# Patient Record
Sex: MALE | Race: BLACK OR AFRICAN AMERICAN | NOT HISPANIC OR LATINO | URBAN - METROPOLITAN AREA
[De-identification: names, ages, dates, MRNs, and addresses within clinical notes are randomized per-mention and may not be internally consistent; named-entity substitution may affect disease eponyms.]

---

## 2017-03-09 ENCOUNTER — EMERGENCY (EMERGENCY)
Facility: HOSPITAL | Age: 52
LOS: 1 days | Discharge: HOME | End: 2017-03-09
Attending: EMERGENCY MEDICINE | Admitting: EMERGENCY MEDICINE
Payer: COMMERCIAL

## 2017-03-09 VITALS
RESPIRATION RATE: 18 BRPM | DIASTOLIC BLOOD PRESSURE: 81 MMHG | OXYGEN SATURATION: 97 % | HEART RATE: 72 BPM | TEMPERATURE: 98 F | SYSTOLIC BLOOD PRESSURE: 155 MMHG

## 2017-03-09 VITALS
WEIGHT: 235.01 LBS | TEMPERATURE: 98 F | OXYGEN SATURATION: 97 % | HEART RATE: 79 BPM | RESPIRATION RATE: 18 BRPM | SYSTOLIC BLOOD PRESSURE: 148 MMHG | DIASTOLIC BLOOD PRESSURE: 88 MMHG

## 2017-03-09 DIAGNOSIS — F17.200 NICOTINE DEPENDENCE, UNSPECIFIED, UNCOMPLICATED: ICD-10-CM

## 2017-03-09 DIAGNOSIS — R07.89 OTHER CHEST PAIN: ICD-10-CM

## 2017-03-09 DIAGNOSIS — R20.0 ANESTHESIA OF SKIN: ICD-10-CM

## 2017-03-09 DIAGNOSIS — Z79.891 LONG TERM (CURRENT) USE OF OPIATE ANALGESIC: ICD-10-CM

## 2017-03-09 DIAGNOSIS — R42 DIZZINESS AND GIDDINESS: ICD-10-CM

## 2017-03-09 LAB
ALBUMIN SERPL ELPH-MCNC: 3.8 G/DL — SIGNIFICANT CHANGE UP (ref 3.4–5)
ALP SERPL-CCNC: 79 U/L — SIGNIFICANT CHANGE UP (ref 40–120)
ALT FLD-CCNC: 39 U/L — SIGNIFICANT CHANGE UP (ref 12–42)
ANION GAP SERPL CALC-SCNC: 8 MMOL/L — LOW (ref 9–16)
APTT BLD: 34.3 SEC — SIGNIFICANT CHANGE UP (ref 27.5–37.4)
AST SERPL-CCNC: 23 U/L — SIGNIFICANT CHANGE UP (ref 15–37)
BASOPHILS NFR BLD AUTO: 0.3 % — SIGNIFICANT CHANGE UP (ref 0–2)
BILIRUB SERPL-MCNC: 0.6 MG/DL — SIGNIFICANT CHANGE UP (ref 0.2–1.2)
BUN SERPL-MCNC: 18 MG/DL — SIGNIFICANT CHANGE UP (ref 7–23)
CALCIUM SERPL-MCNC: 8.4 MG/DL — LOW (ref 8.5–10.5)
CHLORIDE SERPL-SCNC: 103 MMOL/L — SIGNIFICANT CHANGE UP (ref 96–108)
CK MB CFR SERPL CALC: 2.7 NG/ML — SIGNIFICANT CHANGE UP (ref 0.5–3.6)
CK SERPL-CCNC: 562 U/L — HIGH (ref 39–308)
CO2 SERPL-SCNC: 25 MMOL/L — SIGNIFICANT CHANGE UP (ref 22–31)
CREAT SERPL-MCNC: 0.98 MG/DL — SIGNIFICANT CHANGE UP (ref 0.5–1.3)
EOSINOPHIL NFR BLD AUTO: 2 % — SIGNIFICANT CHANGE UP (ref 0–6)
GLUCOSE SERPL-MCNC: 125 MG/DL — HIGH (ref 70–99)
HCT VFR BLD CALC: 40.6 % — SIGNIFICANT CHANGE UP (ref 39–50)
HGB BLD-MCNC: 13.9 G/DL — SIGNIFICANT CHANGE UP (ref 13–17)
INR BLD: 1.04 — SIGNIFICANT CHANGE UP (ref 0.88–1.16)
LYMPHOCYTES # BLD AUTO: 29.4 % — SIGNIFICANT CHANGE UP (ref 13–44)
MCHC RBC-ENTMCNC: 23.2 PG — LOW (ref 27–34)
MCHC RBC-ENTMCNC: 34.2 G/DL — SIGNIFICANT CHANGE UP (ref 32–36)
MCV RBC AUTO: 67.7 FL — LOW (ref 80–100)
MONOCYTES NFR BLD AUTO: 6.1 % — SIGNIFICANT CHANGE UP (ref 2–14)
NEUTROPHILS NFR BLD AUTO: 62.2 % — SIGNIFICANT CHANGE UP (ref 43–77)
PLATELET # BLD AUTO: 207 K/UL — SIGNIFICANT CHANGE UP (ref 150–400)
POTASSIUM SERPL-MCNC: 4 MMOL/L — SIGNIFICANT CHANGE UP (ref 3.5–5.3)
POTASSIUM SERPL-SCNC: 4 MMOL/L — SIGNIFICANT CHANGE UP (ref 3.5–5.3)
PROT SERPL-MCNC: 7.5 G/DL — SIGNIFICANT CHANGE UP (ref 6.4–8.2)
PROTHROM AB SERPL-ACNC: 11.5 SEC — SIGNIFICANT CHANGE UP (ref 10–13.1)
RBC # BLD: 6 M/UL — HIGH (ref 4.2–5.8)
RBC # FLD: 17.1 % — HIGH (ref 10.3–16.9)
SODIUM SERPL-SCNC: 136 MMOL/L — SIGNIFICANT CHANGE UP (ref 135–145)
TROPONIN I SERPL-MCNC: <0.015 NG/ML — SIGNIFICANT CHANGE UP (ref 0.01–0.04)
WBC # BLD: 9.7 K/UL — SIGNIFICANT CHANGE UP (ref 3.8–10.5)
WBC # FLD AUTO: 9.7 K/UL — SIGNIFICANT CHANGE UP (ref 3.8–10.5)

## 2017-03-09 PROCEDURE — 85610 PROTHROMBIN TIME: CPT

## 2017-03-09 PROCEDURE — 93010 ELECTROCARDIOGRAM REPORT: CPT

## 2017-03-09 PROCEDURE — 93010 ELECTROCARDIOGRAM REPORT: CPT | Mod: 77

## 2017-03-09 PROCEDURE — 71275 CT ANGIOGRAPHY CHEST: CPT

## 2017-03-09 PROCEDURE — 93005 ELECTROCARDIOGRAM TRACING: CPT | Mod: 77

## 2017-03-09 PROCEDURE — 82553 CREATINE MB FRACTION: CPT

## 2017-03-09 PROCEDURE — 82550 ASSAY OF CK (CPK): CPT

## 2017-03-09 PROCEDURE — 99285 EMERGENCY DEPT VISIT HI MDM: CPT | Mod: 25

## 2017-03-09 PROCEDURE — 85730 THROMBOPLASTIN TIME PARTIAL: CPT

## 2017-03-09 PROCEDURE — 71275 CT ANGIOGRAPHY CHEST: CPT | Mod: 26

## 2017-03-09 PROCEDURE — 84484 ASSAY OF TROPONIN QUANT: CPT

## 2017-03-09 PROCEDURE — 70498 CT ANGIOGRAPHY NECK: CPT

## 2017-03-09 PROCEDURE — 96374 THER/PROPH/DIAG INJ IV PUSH: CPT

## 2017-03-09 PROCEDURE — 85025 COMPLETE CBC W/AUTO DIFF WBC: CPT

## 2017-03-09 PROCEDURE — 99284 EMERGENCY DEPT VISIT MOD MDM: CPT | Mod: 25

## 2017-03-09 PROCEDURE — 70498 CT ANGIOGRAPHY NECK: CPT | Mod: 26

## 2017-03-09 PROCEDURE — 80053 COMPREHEN METABOLIC PANEL: CPT

## 2017-03-09 RX ORDER — MECLIZINE HCL 12.5 MG
1 TABLET ORAL
Qty: 9 | Refills: 0 | OUTPATIENT
Start: 2017-03-09 | End: 2017-03-12

## 2017-03-09 RX ORDER — MECLIZINE HCL 12.5 MG
25 TABLET ORAL ONCE
Qty: 0 | Refills: 0 | Status: COMPLETED | OUTPATIENT
Start: 2017-03-09 | End: 2017-03-09

## 2017-03-09 RX ORDER — KETOROLAC TROMETHAMINE 30 MG/ML
30 SYRINGE (ML) INJECTION ONCE
Qty: 0 | Refills: 0 | Status: DISCONTINUED | OUTPATIENT
Start: 2017-03-09 | End: 2017-03-09

## 2017-03-09 RX ADMIN — Medication 30 MILLIGRAM(S): at 20:46

## 2017-03-09 RX ADMIN — Medication 30 MILLIGRAM(S): at 20:41

## 2017-03-09 RX ADMIN — Medication 25 MILLIGRAM(S): at 20:41

## 2017-03-09 NOTE — ED PROVIDER NOTE - MEDICAL DECISION MAKING DETAILS
51y male with right sided chest pain radiating to right arm/right side of neck. Atraumatic. Labs WNL, EKG inc RBBB. Seems MSK in origin given exam findings, but will check CT chest/neck for PE or vascular pathology.

## 2017-03-09 NOTE — ED ADULT NURSE NOTE - CHPI ED SYMPTOMS NEG
no diaphoresis/no cough/no back pain/no vomiting/no syncope/no fever/no chills/no nausea/no shortness of breath

## 2017-03-09 NOTE — ED PROVIDER NOTE - OBJECTIVE STATEMENT
51y male no PMH c/o intermittent sharp right sided chest pain radiating to the right arm with right arm numbness since last weeks. Worse with movement. No injuries. No fever, chills, cough, shortness of breath, travel. +Smoker. No PE/DVT history. No cardiac history.

## 2017-03-09 NOTE — ED ADULT NURSE NOTE - OBJECTIVE STATEMENT
50 y/o male c/o right sided chest pain radiating to the right arm/elbow since yesterday. also c/o feeling dizzy/ losing balance yesterday. pt took motrin at 830 this morning for headache and arm/chest pain. denies any LOC, falls, SOB, n/v/d, cardiac hx, medical problems, daily medications. no acute distress, VS stable, a&ox3, speaking in full sentences, EKG done and labs sent.

## 2021-01-04 ENCOUNTER — EMERGENCY (EMERGENCY)
Facility: HOSPITAL | Age: 56
LOS: 1 days | Discharge: HOME | End: 2021-01-04
Attending: EMERGENCY MEDICINE | Admitting: EMERGENCY MEDICINE
Payer: SELF-PAY

## 2021-01-04 VITALS
RESPIRATION RATE: 17 BRPM | WEIGHT: 235.89 LBS | DIASTOLIC BLOOD PRESSURE: 89 MMHG | OXYGEN SATURATION: 98 % | HEIGHT: 69 IN | TEMPERATURE: 98 F | HEART RATE: 92 BPM | SYSTOLIC BLOOD PRESSURE: 138 MMHG

## 2021-01-04 DIAGNOSIS — L30.9 DERMATITIS, UNSPECIFIED: ICD-10-CM

## 2021-01-04 DIAGNOSIS — L03.211 CELLULITIS OF FACE: ICD-10-CM

## 2021-01-04 PROCEDURE — 99283 EMERGENCY DEPT VISIT LOW MDM: CPT

## 2021-01-04 RX ORDER — HYDROCORTISONE 1 %
1 OINTMENT (GRAM) TOPICAL
Qty: 30 | Refills: 0
Start: 2021-01-04

## 2021-01-04 RX ORDER — CEPHALEXIN 500 MG
500 CAPSULE ORAL ONCE
Refills: 0 | Status: COMPLETED | OUTPATIENT
Start: 2021-01-04 | End: 2021-01-04

## 2021-01-04 RX ORDER — CEPHALEXIN 500 MG
1 CAPSULE ORAL
Qty: 28 | Refills: 0
Start: 2021-01-04 | End: 2021-01-10

## 2021-01-04 RX ADMIN — Medication 500 MILLIGRAM(S): at 19:43

## 2021-01-04 NOTE — ED PROVIDER NOTE - PHYSICAL EXAMINATION
+yellow crusting and minimal discharge, and erythema to skin underlying bear and b/l eyebrows, slight extension to anterior neck. mild swelling - pt states this is much improved from yesterday.   No trismus. Jaw FROM. Normal voice. No stridor/drooling. No bony ttp. No bleeding.

## 2021-01-04 NOTE — ED ADULT NURSE NOTE - OBJECTIVE STATEMENT
pt is a 55M no PMH c/c facial pain. Pt was at [facility] 2d ago and had his beard dyed - immediately developed itching/redness/crusting and swelling to face, eyebrows (also dyed) and anterior neck. Immediately washed it all off. Yesterday his face was very swollen, took benadryl and now swelling much improved. Came to ED today because wife convinced him to go - as he still has redness/crusting/itching.   Denies lightheaded, voice changes, SOB/CP, sensation of throat closing or other intra-oral swelling, voice changes, lip swelling, NVD, abd pain, urinary complaints, n/v/d . No prior similar symptoms.

## 2021-01-04 NOTE — ED ADULT NURSE NOTE - NS ED NURSE LEVEL OF CONSCIOUSNESS MENTAL STATUS
Subjective .     REASONS FOR FOLLOWUP:    1. Chronic leukocytosis suspected secondary to smoking and lithium.  2. Borderline iron deficiency anemia continuing on oral iron daily    HISTORY OF PRESENT ILLNESS:  The patient is a 65 y.o. year old male who is here for follow-up with the above-mentioned history.    History of Present Illness patient returns today in follow-up with laboratory studies to review, continuing on oral iron daily.  He is experiencing some mild constipation on oral iron.  He otherwise has been doing well medically.  He has some chronic back pain.  He has been experiencing significant difficulties however in regards to his home life.  He has been caring for his disabled brother who had experienced a stroke for many years.  He finally had to put him into a nursing home recently and is struggling with that decision.  He does continue to smoke.  The patient did not return stool cards for occult blood as requested.  He denies any clinical evidence of GI blood loss however.    Past Medical History:   Diagnosis Date   • Abnormal PSA    • Acute myocardial infarction (CMS/HCC) 2004    Stents   • Apnea, sleep    • Atherosclerotic heart disease    • Chronic pain     Chronic low back paoin, MRI 12/11,  L3 compression deformity, L2 degenerative disc disease and L5-S1 degenerative disc disease.   • Colon polyp    • Constipation    • COPD (chronic obstructive pulmonary disease) (CMS/HCC)    • Coronary artery disease    • Depressed bipolar II disorder (CMS/HCC)    • Diabetes mellitus (CMS/HCC)    • Fatigue    • GERD (gastroesophageal reflux disease)    • H/O transfusion of packed red blood cells    • Health care maintenance    • Hearing loss    • History of back pain    • Hypercholesterolemia    • Hyperlipidemia    • Hypertension    • Leukocytosis    • Osteoarthritis    • Proteinuria    • PVD (peripheral vascular disease) (CMS/HCC)    • Sebaceous cyst    • Seborrheic dermatitis    • Sleep apnea    • Tinea  corporis    • Tobacco use    • Tremor      Past Surgical History:   Procedure Laterality Date   • ANGIOPLASTY      Cath Stent Placement   • COLONOSCOPY  01/22/2014    ta polyps   • COLONOSCOPY N/A 11/29/2016    Procedure: COLONOSCOPY TO CECUM AND TERMINAL ILEUM WITH HOT POLYPECTOMIES;  Surgeon: Ivette Franco MD;  Location: [facility] ENDOSCOPY;  Service:    • CORONARY STENT PLACEMENT  2004   • EYE SURGERY      Cataract Surgery   • HAND SURGERY             Current Outpatient Medications on File Prior to Visit   Medication Sig Dispense Refill   • albuterol sulfate  (90 Base) MCG/ACT inhaler Inhale 2 puffs Every 4 (Four) Hours As Needed for Wheezing or Shortness of Air. 3 inhaler 3   • amLODIPine (NORVASC) 10 MG tablet Take 1 tablet by mouth Daily. for blood pressure 90 tablet 1   • aspirin 81 MG tablet Take 1 tablet by mouth Daily.     • atorvastatin (LIPITOR) 80 MG tablet Take 1 tablet by mouth Daily. For cholesterol (new dose) 90 tablet 3   • carvedilol (COREG) 25 MG tablet Take 1 tablet by mouth 2 (Two) Times a Day With Meals. For heart 180 tablet 3   • cholecalciferol (VITAMIN D3) 1000 UNITS tablet Take 1 tablet by mouth 2 (two) times a day.     • Cyanocobalamin (B-12) 1000 MCG capsule Take 1 tablet by mouth Daily.     • FLUoxetine (PROzac) 20 MG capsule 3 PO daily for anxiety and depression 270 capsule 3   • lisinopril-hydrochlorothiazide (PRINZIDE,ZESTORETIC) 20-12.5 MG per tablet Take 1 tablet by mouth Daily. For BP and renal protection 90 tablet 3   • lithium carbonate 150 MG capsule One PO in AM and 2 PO daily with food for mood 270 capsule 3   • metFORMIN (GLUCOPHAGE) 1000 MG tablet TAKE 1 TABLET BY MOUTH TWICE DAILY 180 tablet 1   • omeprazole (priLOSEC) 40 MG capsule Take 1 capsule by mouth Daily. For GERD (stop Nexium) 90 capsule 3   • polyethylene glycol (MIRALAX) packet MIX 1 PACKET (17 GRAMS ) AS DIRECTED AND TAKE BY MOUTH DAILY *CAN REDUCE TO EVERY OTHER DAY OR EVERY 3 DAYS IF STOOLS TOO LOOSE  90 each 4   • QUEtiapine (SEROquel) 100 MG tablet Take 1 tablet by mouth Every Night. For sleep 90 tablet 1   • tamsulosin (FLOMAX) 0.4 MG capsule 24 hr capsule Take 1 capsule by mouth Daily.     • umeclidinium-vilanterol (ANORO ELLIPTA) 62.5-25 MCG/INH aerosol powder  inhaler Inhale 1 puff Daily. For COPD and still use Albuterol as needed 1 each 11   • mupirocin (BACTROBAN) 2 % ointment Apply  topically to the appropriate area as directed 3 (Three) Times a Day. To wound area until healed 1 each 1   • wheat dextrin (BENEFIBER ON THE GO) powder powder TAKE PER PACKAGE DIRECTIONS 28 each 10     No current facility-administered medications on file prior to visit.        ALLERGIES:     Allergies   Allergen Reactions   • Clopidogrel Itching       Social History     Socioeconomic History   • Marital status:      Spouse name: Not on file   • Number of children: Not on file   • Years of education: Not on file   • Highest education level: Not on file   Occupational History     Employer: DISABLED   Tobacco Use   • Smoking status: Current Every Day Smoker     Packs/day: 1.00     Years: 47.00     Pack years: 47.00     Types: Cigarettes   • Smokeless tobacco: Never Used   • Tobacco comment: NO CAFFEINE USE   50 years 1 ppd    Substance and Sexual Activity   • Alcohol use: No   • Drug use: No   • Sexual activity: Defer     Family History   Problem Relation Age of Onset   • Cancer Mother    • Diabetes Mother    • Hypertension Mother    • Colon cancer Mother    • Heart disease Father    • Cancer Brother    • Stroke Brother               Review of Systems   Constitutional: Positive for fatigue. Negative for activity change, appetite change, fever and unexpected weight change.   HENT: Negative for congestion, mouth sores, nosebleeds, sore throat and voice change.    Respiratory: Positive for shortness of breath. Negative for cough and wheezing.    Cardiovascular: Negative for chest pain, palpitations and leg swelling.    Gastrointestinal: Negative for abdominal distention, abdominal pain, blood in stool, constipation, diarrhea, nausea and vomiting.   Endocrine: Negative for cold intolerance and heat intolerance.   Genitourinary: Negative for difficulty urinating, dysuria, frequency and hematuria.   Musculoskeletal: Positive for arthralgias. Negative for back pain, joint swelling and myalgias.   Skin: Negative for rash.   Neurological: Negative for dizziness, syncope, weakness, light-headedness, numbness and headaches.   Hematological: Negative for adenopathy. Does not bruise/bleed easily.   Psychiatric/Behavioral: Positive for dysphoric mood. Negative for confusion and sleep disturbance. The patient is not nervous/anxious.          Objective      Vitals:    06/12/19 1427   BP: 126/65   Pulse: 72   Resp: 18   Temp: 98.6 °F (37 °C)   SpO2: 93%      Current Status 6/12/2019   ECOG score 0   Pain 0/10    Physical Exam   Constitutional: He is oriented to person, place, and time. He appears well-developed and well-nourished.   HENT:   Mouth/Throat: Oropharynx is clear and moist.   Eyes: Conjunctivae are normal.   Neck: No thyromegaly present.   Cardiovascular: Normal rate and regular rhythm. Exam reveals no gallop and no friction rub.   No murmur heard.  Pulmonary/Chest: No respiratory distress. He has no wheezes.   Scattered bilateral rhonchi   Abdominal: Soft. Bowel sounds are normal. He exhibits no distension. There is no tenderness.   Musculoskeletal: He exhibits no edema.   Lymphadenopathy:        Head (right side): No submandibular adenopathy present.     He has no cervical adenopathy.     He has no axillary adenopathy.        Right: No inguinal and no supraclavicular adenopathy present.        Left: No inguinal and no supraclavicular adenopathy present.   Neurological: He is alert and oriented to person, place, and time. He displays normal reflexes. No cranial nerve deficit. He exhibits normal muscle tone.   Skin: Skin is warm  and dry. No rash noted.   Psychiatric: He has a normal mood and affect. His behavior is normal.       RECENT LABS:  Hematology WBC   Date Value Ref Range Status   06/12/2019 11.81 (H) 3.40 - 10.80 10*3/mm3 Final   07/20/2018 13.51 (H) 4.50 - 10.70 10*3/mm3 Final     RBC   Date Value Ref Range Status   06/12/2019 4.21 4.14 - 5.80 10*6/mm3 Final   07/20/2018 4.52 (L) 4.60 - 6.00 10*6/mm3 Final     Hemoglobin   Date Value Ref Range Status   06/12/2019 12.3 (L) 13.0 - 17.7 g/dL Final     Hematocrit   Date Value Ref Range Status   06/12/2019 38.4 37.5 - 51.0 % Final     Platelets   Date Value Ref Range Status   06/12/2019 250 140 - 450 10*3/mm3 Final        Additional labs 6/12/2019: Iron 71, ferritin 84.5, iron saturation 18%, TIBC 392    Assessment/Plan     1. Chronic mild leukocytosis:  · This is long-standing with a WBC in the 11-45061 range normal differential dating at least back to 5/4/16 if not before.  · There does not appear to be any obvious underlying infectious issue.  There does not appear to be a chronic inflammatory state.  · Review of peripheral blood smear identified predominantly normal segmented neutrophils no immature forms identified.  · No evidence of malignancy on CT chest 8/28/18  · Laboratory evaluation 8/21/18 with ESR 12, CRP 0.41, peripheral blood flow cytometry negative.  · The patient has been on lithium for quite some time and this is likely a contributing factor to his leukocytosis as this is a described side effect of the drug.  · Likely in part also related to smoking, continues to smoke 1 pack per day with 50-pack-year history  · Today, WBC is borderline elevated at 11.81 with differential of 76 segs, 14 lymphs, 7 monocytes.  2. Normocytic anemia with evidence of borderline iron deficiency:  · Decline in hemoglobin 8/21/18 to 12.0 with normal MCV 88.  · Previous normal B12 and folate in July 2017  · Low normal ferritin of 37.8 on 7/20/18.  · Previous colonoscopy 11/29/16 with   Joan with 2 polyps removed (tubular adenoma with low-grade dysplasia)  · Laboratory evaluation 8/21/18 with iron 62, ferritin 32.7, iron saturation 14%, TIBC 435, B12 with 2000, folate greater than 20, ESR 12, CRP 0.41.  · With borderline iron studies, initiated oral iron daily 9/11/18 and requested return stool cards for occult blood ×3 with potential referral back to GI positive.  · Patient did not return stool cards as requested.  · The patient returns today with iron studies to review from 6/12/2019 showing improvement with ferritin 84.5 and iron saturation 19% with hemoglobin 12.3.  He is experiencing some mild constipation from iron and I have asked him to decrease this to every other day dosing.  We will recheck iron studies in 6 months.  As noted above, he did not return stool cards as requested for occult blood.  His last colonoscopy was in November 2016.  We will go ahead and refer him back to GI to discuss timing of follow-up endoscopic evaluation.     Plan:  1. Decrease oral iron from once daily to every other day dosing  2. Refer back to GI to discuss need for additional endoscopic evaluation  3. M.D. visit in 6 months with CBC, iron panel and ferritin 1 week prior to visit                           Awake

## 2021-01-04 NOTE — ED PROVIDER NOTE - PATIENT PORTAL LINK FT
You can access the FollowMyHealth Patient Portal offered by [facility] by registering at the following website: [email or web address]. By joining [employer]’s FollowMyHealth portal, you will also be able to view your health information using other applications (apps) compatible with our system.

## 2021-01-04 NOTE — ED ADULT TRIAGE NOTE - CHIEF COMPLAINT QUOTE
Patient was getting a shave at his [facility] 2 days ago and had an allergic reaction to the shaving cream.  Redness, hives to beard area, neck and upper chest since 2 days ago.  Took benadryl yesterday with mild relief.  Denies throat / chest discomfort, sob,  abdominal pain.  Patient speaking clearly in full sentences

## 2021-01-04 NOTE — ED PROVIDER NOTE - NSFOLLOWUPINSTRUCTIONS_ED_ALL_ED_FT
You likely have "dermatitis" with possible developing skin infection (cellulitis).    Can take tylenol 650mg every 6hrs as needed for pain.  Take benadryl 25-50mg every 6hrs as needed for itching.  Take antibiotics as prescribed.   Take daily pictures of affected area.  Stay well hydrated.  Return to ER for fevers, persistent vomit, uncontrolled pain, worsening breathing, worsening lightheaded, spreading redness, worsening swelling.  If no improvement within 2-3 days can try hydrocortisone cream - do not use for more than 3-5 days.   Follow up with primary doctor within 1-2 days.   Follow up with dermatologist if symptoms arent improving.     Cellulitis    Cellulitis is a skin infection caused by bacteria. This condition occurs most often in the arms and lower legs but can occur anywhere over the body. Symptoms include redness, swelling, warm skin, tenderness, and chills/fever. If you were prescribed an antibiotic medicine, take it as told by your health care provider. Do not stop taking the antibiotic even if you start to feel better.    SEEK IMMEDIATE MEDICAL CARE IF YOU HAVE ANY OF THE FOLLOWING SYMPTOMS: worsening fever, red streaks coming from affected area, vomiting or diarrhea, or dizziness/lightheadedness.     Contact Dermatitis    Dermatitis is redness, soreness, and swelling (inflammation) of the skin. Contact dermatitis is a reaction to certain substances that touch the skin. Many substances can cause contact dermatitis including poisonous plants, chemicals, jewelry, metals, etc. Symptoms can include dryness, redness, itching, and pain.    SEEK IMMEDIATE MEDICAL CARE IF YOU HAVE ANY OF THE FOLLOWING SYMPTOMS: headache, fever, vomiting, red streaking from the affected area, or shortness of breath.

## 2021-01-04 NOTE — ED PROVIDER NOTE - OBJECTIVE STATEMENT
55M no PMH p/w facial pain. Pt was at [facility] 2d ago and had his beard dyed - immediately developed itching/redness/crusting and swelling to face, eyebrows (also dyed) and anterior neck. Immediately washed it all off. Yesterday his face was very swollen, took benadryl and now swelling much improved. Came to ED today bec wife convinced him to go - as he still has redness/crusting/itching.   Denies lightheaded, voice changes, SOB/CP, sensation of throat closing or other intra-oral swelling, voice changes, lip swelling, NVD, abd pain, urinary complaints. No prior similar symptoms.

## 2021-01-04 NOTE — ED PROVIDER NOTE - CLINICAL SUMMARY MEDICAL DECISION MAKING FREE TEXT BOX
55M no PMH p/w facial pain. Pt was at [facility] 2d ago and had his beard dyed - immediately developed itching/redness/crusting and swelling to face, eyebrows (also dyed) and anterior neck. Immediately washed it all off. Yesterday his face was very swollen, took benadryl and now swelling much improved. Came to ED today bec wife convinced him to go - as he still has redness/crusting/itching. No other systemic symptoms. Vitals wnl, exam as above.  ddx: Likely localized allergic rexn/contact dermatitis, possible developing 2/2 cellulitis.

## 2021-12-15 NOTE — ED PROVIDER NOTE - CARDIAC, MLM
For information on Fall & Injury Prevention, visit: [email or web address] OR  [email or web address] OR  https://www.cdc.gov/steadi/patient.html Normal rate, regular rhythm.  Heart sounds S1, S2.  No murmurs, rubs or gallops.

## 2022-02-10 NOTE — ED ADULT NURSE NOTE - SUICIDE SCREENING QUESTION 1
[FreeTextEntry1] : 63 yo male with elevated PSA (11ng/ml per patient), no prior MRI, benign biopsy 12/16/21, neg FH, neg KEELEY.\par \par 1. PSA\par 2. MRI at [place]\par \par Will call with MRI results and recommend targeted biopsy pending MRI findings No
